# Patient Record
Sex: MALE | URBAN - METROPOLITAN AREA
[De-identification: names, ages, dates, MRNs, and addresses within clinical notes are randomized per-mention and may not be internally consistent; named-entity substitution may affect disease eponyms.]

---

## 2019-02-21 ENCOUNTER — APPOINTMENT (RX ONLY)
Dept: URBAN - METROPOLITAN AREA CLINIC 168 | Facility: CLINIC | Age: 44
Setting detail: DERMATOLOGY
End: 2019-02-21

## 2019-02-21 VITALS — HEIGHT: 69 IN | WEIGHT: 170 LBS

## 2019-02-21 DIAGNOSIS — L10.0 PEMPHIGUS VULGARIS: ICD-10-CM

## 2019-02-21 PROCEDURE — ? COUNSELING

## 2019-02-21 PROCEDURE — ? ORDER TESTS

## 2019-02-21 PROCEDURE — ? PRESCRIPTION

## 2019-02-21 PROCEDURE — 99204 OFFICE O/P NEW MOD 45 MIN: CPT

## 2019-02-21 PROCEDURE — ? ADDITIONAL NOTES

## 2019-02-21 RX ORDER — DEXAMETHASONE 0.5 MG/5ML
5 ELIXIR ORAL DAILY
Qty: 500 | Refills: 0 | Status: ERX | COMMUNITY
Start: 2019-02-21

## 2019-02-21 RX ORDER — LIDOCAINE HYDROCHLORIDE 20 MG/ML
5 SOLUTION ORAL; TOPICAL DAILY
Qty: 1 | Refills: 3 | Status: ERX | COMMUNITY
Start: 2019-02-21

## 2019-02-21 RX ADMIN — LIDOCAINE HYDROCHLORIDE 5: 20 SOLUTION ORAL; TOPICAL at 00:00

## 2019-02-21 RX ADMIN — DEXAMETHASONE 5: 0.5 ELIXIR ORAL at 00:00

## 2019-02-21 ASSESSMENT — SEVERITY ASSESSMENT: SEVERITY: MODERATE

## 2019-02-21 ASSESSMENT — PAIN INTENSITY VAS: HOW INTENSE IS YOUR PAIN 0 BEING NO PAIN, 10 BEING THE MOST SEVERE PAIN POSSIBLE?: 7/10 PAIN

## 2019-02-21 NOTE — HPI: BLISTERS (PEMPHIGUS VULGARIS)
How Severe Is It?: moderate
Is This A New Presentation, Or A Follow-Up?: Blisters
Additional History: Patient was biopsies by ENT initially resulting in pemphigus.  Patient was treated in 2012 with Prednisone.  He was on for a couple years.  He saw a dermatologist after 2 years and was treated with minocycline which allowed him to get off prednisone. He took cellcept as well in the past but does not believe it was effective.   He recently started to flare up again 3-4 months ago.  He would prefer to stay away from prednisone if possible as he had a lot of negative side effects from the medication.

## 2019-02-21 NOTE — PROCEDURE: ADDITIONAL NOTES
Detail Level: Simple
Additional Notes: Considering the risks and benefits of both of his current options, and the fact that Rituxan is FDA approved for Pemphigus, can long lasting remissions, we discussed this thoroughly and he would like to proceed with Rituxan.  \\n\\nPatient advised to get a flu shot and make sure he is up to date with all immunizations prior to Rituxan treatments.

## 2019-03-19 RX ORDER — DEXAMETHASONE 0.5 MG/5ML
ELIXIR ORAL DAILY
Qty: 500 | Refills: 0 | Status: ACTIVE

## 2019-04-02 ENCOUNTER — APPOINTMENT (RX ONLY)
Dept: URBAN - METROPOLITAN AREA CLINIC 168 | Facility: CLINIC | Age: 44
Setting detail: DERMATOLOGY
End: 2019-04-02

## 2019-04-02 DIAGNOSIS — L10.0 PEMPHIGUS VULGARIS: ICD-10-CM

## 2019-04-02 PROCEDURE — ? ORDER TESTS

## 2019-04-24 ENCOUNTER — RX ONLY (OUTPATIENT)
Age: 44
Setting detail: RX ONLY
End: 2019-04-24

## 2019-04-24 RX ORDER — DEXAMETHASONE 0.5 MG/5ML
1 ELIXIR ORAL
Qty: 500 | Refills: 2 | Status: ERX

## 2019-12-06 ENCOUNTER — APPOINTMENT (RX ONLY)
Dept: URBAN - METROPOLITAN AREA CLINIC 168 | Facility: CLINIC | Age: 44
Setting detail: DERMATOLOGY
End: 2019-12-06

## 2019-12-06 DIAGNOSIS — L10.0 PEMPHIGUS VULGARIS: ICD-10-CM | Status: WORSENING

## 2019-12-06 PROCEDURE — ? ADDITIONAL NOTES

## 2019-12-06 PROCEDURE — 99214 OFFICE O/P EST MOD 30 MIN: CPT

## 2019-12-06 PROCEDURE — ? COUNSELING

## 2019-12-06 PROCEDURE — ? ORDER TESTS

## 2019-12-06 PROCEDURE — ? PRESCRIPTION

## 2019-12-06 RX ORDER — CLOBETASOL PROPIONATE 0.5 MG/ML
1 SOLUTION TOPICAL QD
Qty: 1 | Refills: 1 | Status: ERX | COMMUNITY
Start: 2019-12-06

## 2019-12-06 RX ORDER — LIDOCAINE HYDROCHLORIDE 20 MG/ML
5 SOLUTION ORAL; TOPICAL DAILY
Qty: 1 | Refills: 3 | Status: ERX | COMMUNITY
Start: 2019-12-06

## 2019-12-06 RX ORDER — CLOBETASOL PROPIONATE 0.5 MG/G
1 GEL TOPICAL BID
Qty: 1 | Refills: 2 | Status: ERX | COMMUNITY
Start: 2019-12-06

## 2019-12-06 RX ORDER — DEXAMETHASONE 0.5 MG/5ML
5 ELIXIR ORAL DAILY
Qty: 500 | Refills: 0 | Status: CANCELLED

## 2019-12-06 RX ADMIN — CLOBETASOL PROPIONATE 1: 0.5 GEL TOPICAL at 00:00

## 2019-12-06 RX ADMIN — CLOBETASOL PROPIONATE 1: 0.5 SOLUTION TOPICAL at 00:00

## 2019-12-06 RX ADMIN — LIDOCAINE HYDROCHLORIDE 5: 20 SOLUTION ORAL; TOPICAL at 00:00

## 2019-12-06 NOTE — PROCEDURE: ADDITIONAL NOTES
Detail Level: Simple
Additional Notes: Considering the risks and benefits of both of his current options, and the fact that Rituxan is FDA approved for Pemphigus, can long lasting remissions, we discussed this thoroughly and he would like to proceed with Rituxan. His insurance cost for Rituxan was excessive, and since his insurance changes January 1st, 2020 to [employer], we will reapply for Rituxan once he sends us his new cards. \\n\\nPatient advised to get a flu shot and make sure he is up to date with all immunizations prior to Rituxan treatments.\\n\\Adelfo he has not been on systemic treatment, and we do not know if his [employer] will cover the Rituxan at a lower cost, will proceed with Azathioprine.

## 2019-12-11 ENCOUNTER — RX ONLY (OUTPATIENT)
Age: 44
Setting detail: RX ONLY
End: 2019-12-11

## 2021-01-14 ENCOUNTER — APPOINTMENT (RX ONLY)
Dept: URBAN - METROPOLITAN AREA CLINIC 168 | Facility: CLINIC | Age: 46
Setting detail: DERMATOLOGY
End: 2021-01-14

## 2021-01-14 VITALS — TEMPERATURE: 98.1 F

## 2021-01-14 DIAGNOSIS — L10.0 PEMPHIGUS VULGARIS: ICD-10-CM | Status: WORSENING

## 2021-01-14 DIAGNOSIS — F17.200 NICOTINE DEPENDENCE, UNSPECIFIED, UNCOMPLICATED: ICD-10-CM

## 2021-01-14 PROCEDURE — ? ORDER TESTS

## 2021-01-14 PROCEDURE — ? PRESCRIPTION

## 2021-01-14 PROCEDURE — 99214 OFFICE O/P EST MOD 30 MIN: CPT

## 2021-01-14 PROCEDURE — ? COUNSELING

## 2021-01-14 PROCEDURE — ? TREATMENT REGIMEN

## 2021-01-14 RX ORDER — TRIAMCINOLONE ACETONIDE 1 MG/G
1 OINTMENT TOPICAL BID
Qty: 1 | Refills: 1 | Status: ERX | COMMUNITY
Start: 2021-01-14

## 2021-01-14 RX ORDER — AZATHIOPRINE 50 MG/1
1 TABLET ORAL QD
Qty: 30 | Refills: 2 | Status: ERX | COMMUNITY
Start: 2021-01-14

## 2021-01-14 RX ORDER — PREDNISONE 10 MG/1
TABLET ORAL AS DIRECTED
Qty: 45 | Refills: 0 | Status: ERX | COMMUNITY
Start: 2021-01-14

## 2021-01-14 RX ORDER — LIDOCAINE HYDROCHLORIDE 20 MG/ML
5 SOLUTION ORAL; TOPICAL DAILY
Qty: 1 | Refills: 3 | Status: ERX

## 2021-01-14 RX ADMIN — TRIAMCINOLONE ACETONIDE 1: 1 OINTMENT TOPICAL at 00:00

## 2021-01-14 RX ADMIN — AZATHIOPRINE 1: 50 TABLET ORAL at 00:00

## 2021-01-14 RX ADMIN — PREDNISONE: 10 TABLET ORAL at 00:00

## 2021-01-14 ASSESSMENT — PAIN INTENSITY VAS: HOW INTENSE IS YOUR PAIN 0 BEING NO PAIN, 10 BEING THE MOST SEVERE PAIN POSSIBLE?: 6/10 PAIN

## 2021-01-14 ASSESSMENT — SEVERITY ASSESSMENT: SEVERITY: MODERATE

## 2021-01-14 NOTE — PROCEDURE: MIPS QUALITY
Quality 431: Preventive Care And Screening: Unhealthy Alcohol Use - Screening: Patient screened for unhealthy alcohol use using a single question and scores less than 2 times per year
Quality 110: Preventive Care And Screening: Influenza Immunization: Influenza Immunization previously received during influenza season
Quality 226: Preventive Care And Screening: Tobacco Use: Screening And Cessation Intervention: Patient screened for tobacco use, is a smoker AND received Cessation Counseling
Detail Level: Detailed

## 2021-01-14 NOTE — PROCEDURE: TREATMENT REGIMEN
Plan: CBC, CMP with desmogleins - draw now\\n\\nDiscussed that he is now considered immunosuppressed, and so he needs to be even more strict about social distancing, mask wearing, avoiding public places, grocery shop only when minimal crowds (as he already is) as he has increased risk of severe COVID infection being on these medications. Given the severity of his symptoms of disease, and repeated flares, inability to get Rituxan covered at reasonable cost to patient, this is his best option to control disease and prevent further flares in the future.
Detail Level: Zone
Initiate Treatment: Restart azathioprine 50 mg once recent labs are reviewed and OK to start. Check CBC and CMP 2 weeks later and if WNL will increase to 100mg/d, then check labs 2 weeks later.    \\n\\nStart prednisone 2  week pulse to calm current flare given significant pain from genital lesions pt states he is willing to deal with the side effects of prednisone (in the past he did not). \\n\\nTriamcinolone 0.1% ointment BID for genital lesions for no longer than 2 weeks.
Continue Regimen: Clobetasol gel and viscous lidocaine 2% topically for mouth

## 2021-02-03 ENCOUNTER — APPOINTMENT (RX ONLY)
Dept: URBAN - METROPOLITAN AREA CLINIC 168 | Facility: CLINIC | Age: 46
Setting detail: DERMATOLOGY
End: 2021-02-03

## 2021-02-03 DIAGNOSIS — F17.200 NICOTINE DEPENDENCE, UNSPECIFIED, UNCOMPLICATED: ICD-10-CM

## 2021-02-03 DIAGNOSIS — L10.0 PEMPHIGUS VULGARIS: ICD-10-CM

## 2021-02-03 PROCEDURE — ? TREATMENT REGIMEN

## 2021-02-03 PROCEDURE — ? COUNSELING

## 2021-02-03 PROCEDURE — ? PRESCRIPTION

## 2021-02-03 PROCEDURE — ? ORDER TESTS

## 2021-02-03 PROCEDURE — 99214 OFFICE O/P EST MOD 30 MIN: CPT

## 2021-02-03 RX ORDER — MYCOPHENOLATE MOFETIL 500 MG/1
1 TABLET, FILM COATED ORAL BID
Qty: 180 | Refills: 3 | Status: ERX | COMMUNITY
Start: 2021-02-03

## 2021-02-03 RX ORDER — ALENDRONATE SODIUM 70 MG/1
1 TABLET ORAL AS DIRECTED
Qty: 4 | Refills: 6 | Status: ERX | COMMUNITY
Start: 2021-02-03

## 2021-02-03 RX ORDER — PANTOPRAZOLE SODIUM 40 MG/1
1 TABLET, DELAYED RELEASE ORAL QD
Qty: 30 | Refills: 6 | Status: ERX | COMMUNITY
Start: 2021-02-03

## 2021-02-03 RX ORDER — PREDNISONE 10 MG/1
TABLET ORAL AS DIRECTED
Qty: 150 | Refills: 0 | Status: ERX

## 2021-02-03 RX ORDER — LIDOCAINE 50 MG/G
1 OINTMENT TOPICAL AS DIRECTED
Qty: 1 | Refills: 2 | Status: ERX | COMMUNITY
Start: 2021-02-03

## 2021-02-03 RX ADMIN — LIDOCAINE 1: 50 OINTMENT TOPICAL at 00:00

## 2021-02-03 RX ADMIN — MYCOPHENOLATE MOFETIL 1: 500 TABLET, FILM COATED ORAL at 00:00

## 2021-02-03 RX ADMIN — ALENDRONATE SODIUM 1: 70 TABLET ORAL at 00:00

## 2021-02-03 RX ADMIN — PANTOPRAZOLE SODIUM 1: 40 TABLET, DELAYED RELEASE ORAL at 00:00

## 2021-02-03 ASSESSMENT — SEVERITY ASSESSMENT: SEVERITY: MODERATE

## 2021-02-03 ASSESSMENT — PAIN INTENSITY VAS: HOW INTENSE IS YOUR PAIN 0 BEING NO PAIN, 10 BEING THE MOST SEVERE PAIN POSSIBLE?: 2/10 PAIN

## 2021-02-03 NOTE — PROCEDURE: TREATMENT REGIMEN
Plan: Discussed that he is now considered more immunosuppressed given both prednisone and mycophenolate, and so he needs to be even more strict about social distancing, mask wearing, avoiding public places, grocery shop only when minimal crowds (as he already is) as he has increased risk of severe COVID infection being on these medications. \\n\\nGiven the severity of his symptoms of disease, and repeated flares, inability to get Rituxan covered at reasonable cost to patient, this is his best option to control disease and prevent further flares in the future
Detail Level: Zone
Initiate Treatment: Cellcept 500mg 1 BID, increase to 2 BID if blood work WNL in 2 weeks\\nAlendronate 70mg once a week - advised to stay upright 30 minutes after dose\\nProtonix 40mg once a day\\nLidocaine 5% ointment for genital skin QID as needed
Continue Regimen: Clobetasol gel and viscous lidocaine 2% topically for mouth\\nTriamcinolone 0.1% ointment BID for genital lesions for no longer than 2 weeks, take 1 week break, repeat\\n
Modify Regimen: Prednisone taper - repeat 50mg x 5 days, 30mg x 5 days, 10mg then until follow up.

## 2021-02-05 ENCOUNTER — RX ONLY (OUTPATIENT)
Age: 46
Setting detail: RX ONLY
End: 2021-02-05

## 2021-02-05 RX ORDER — MYCOPHENOLATE MOFETIL 500 MG/1
1 TABLET, FILM COATED ORAL BID
Qty: 60 | Refills: 2 | Status: ERX | COMMUNITY
Start: 2021-02-05

## 2021-02-19 ENCOUNTER — RX ONLY (OUTPATIENT)
Age: 46
Setting detail: RX ONLY
End: 2021-02-19

## 2021-02-19 RX ORDER — MYCOPHENOLATE MOFETIL 500 MG/1
1 TABLET, FILM COATED ORAL AS DIRECTED
Qty: 120 | Refills: 6 | Status: ERX

## 2021-04-29 ENCOUNTER — APPOINTMENT (RX ONLY)
Dept: URBAN - METROPOLITAN AREA CLINIC 168 | Facility: CLINIC | Age: 46
Setting detail: DERMATOLOGY
End: 2021-04-29

## 2021-04-29 DIAGNOSIS — F17.200 NICOTINE DEPENDENCE, UNSPECIFIED, UNCOMPLICATED: ICD-10-CM

## 2021-04-29 DIAGNOSIS — L10.0 PEMPHIGUS VULGARIS: ICD-10-CM | Status: IMPROVED

## 2021-04-29 PROCEDURE — 99214 OFFICE O/P EST MOD 30 MIN: CPT

## 2021-04-29 PROCEDURE — ? COUNSELING

## 2021-04-29 PROCEDURE — ? ORDER TESTS

## 2021-04-29 PROCEDURE — ? ADDITIONAL NOTES

## 2021-04-29 ASSESSMENT — PAIN INTENSITY VAS: HOW INTENSE IS YOUR PAIN 0 BEING NO PAIN, 10 BEING THE MOST SEVERE PAIN POSSIBLE?: 1/10 PAIN

## 2021-04-29 ASSESSMENT — SEVERITY ASSESSMENT: SEVERITY: MILD

## 2021-04-29 NOTE — PROCEDURE: ADDITIONAL NOTES
Additional Notes: Continue:\\n-Cellcept 500mg 2 BID \\n-Alendronate 70mg once a week - advised to stay upright 30 minutes after dose\\n-Protonix 40mg once a day\\n-Lidocaine 5% ointment for genital skin QID as needed\\n-Prednisone 10mg a day for one more month. If he clears further then he will drop to 5 mg. \\n\\nIf he flares with decrease of prednisone may consider increase cellcept dose. \\n\\n-Clobetasol gel and viscous lidocaine 2% topically for mouth\\n-Triamcinolone 0.1% ointment BID for genital lesions for no longer than 2 weeks, take 1 week break, repeat

## 2021-06-29 ENCOUNTER — APPOINTMENT (RX ONLY)
Dept: URBAN - METROPOLITAN AREA CLINIC 168 | Facility: CLINIC | Age: 46
Setting detail: DERMATOLOGY
End: 2021-06-29

## 2021-06-29 DIAGNOSIS — L10.0 PEMPHIGUS VULGARIS: ICD-10-CM | Status: UNCHANGED

## 2021-06-29 DIAGNOSIS — F17.200 NICOTINE DEPENDENCE, UNSPECIFIED, UNCOMPLICATED: ICD-10-CM

## 2021-06-29 PROCEDURE — ? COUNSELING

## 2021-06-29 PROCEDURE — ? ADDITIONAL NOTES

## 2021-06-29 PROCEDURE — 99214 OFFICE O/P EST MOD 30 MIN: CPT

## 2021-06-29 PROCEDURE — ? ORDER TESTS

## 2021-06-29 PROCEDURE — ? PRESCRIPTION

## 2021-06-29 RX ORDER — MYCOPHENOLATE MOFETIL 500 MG/1
TABLET, FILM COATED ORAL BID
Qty: 540 | Refills: 3 | Status: ERX

## 2021-06-29 ASSESSMENT — PAIN INTENSITY VAS: HOW INTENSE IS YOUR PAIN 0 BEING NO PAIN, 10 BEING THE MOST SEVERE PAIN POSSIBLE?: NO PAIN

## 2021-06-29 ASSESSMENT — SEVERITY ASSESSMENT: SEVERITY: MODERATE

## 2021-06-29 NOTE — PROCEDURE: ADDITIONAL NOTES
Patient Management Risk Assessment: Moderate
Additional Notes: Overall much improved, but genital involvement persistent. \\n\\nModify:\\n-Increase Cellcept 500mg 3 pills twice a day\\n\\nContinue:\\n-Alendronate 70mg once a week - advised to stay upright 30 minutes after dose\\n-Protonix 40mg once a day\\n-Lidocaine 5% ointment for genital skin QID as needed\\n-Prednisone 5mg a day \\n\\n-Clobetasol gel and viscous lidocaine 2% topically for mouth\\n-Triamcinolone 0.1% ointment BID for genital lesions for no longer than 2 weeks, take 1 week break, repeat
Render Risk Assessment In Note?: no
Detail Level: Detailed

## 2023-10-03 ENCOUNTER — APPOINTMENT (RX ONLY)
Dept: URBAN - METROPOLITAN AREA CLINIC 168 | Facility: CLINIC | Age: 48
Setting detail: DERMATOLOGY
End: 2023-10-03

## 2023-10-03 DIAGNOSIS — L10.0 PEMPHIGUS VULGARIS: ICD-10-CM | Status: INADEQUATELY CONTROLLED

## 2023-10-03 PROCEDURE — ? COUNSELING

## 2023-10-03 PROCEDURE — ? ORDER TESTS

## 2023-10-03 PROCEDURE — 99214 OFFICE O/P EST MOD 30 MIN: CPT

## 2023-10-03 PROCEDURE — ? PRESCRIPTION MEDICATION MANAGEMENT

## 2023-10-03 PROCEDURE — ? PRESCRIPTION

## 2023-10-03 RX ORDER — CLOBETASOL PROPIONATE 0.05 MG/G
1 GEL TOPICAL BID
Qty: 60 | Refills: 3 | Status: ERX | COMMUNITY
Start: 2023-10-03

## 2023-10-03 RX ADMIN — CLOBETASOL PROPIONATE 1: 0.05 GEL TOPICAL at 00:00

## 2023-10-03 NOTE — PROCEDURE: MIPS QUALITY
Quality 110: Preventive Care And Screening: Influenza Immunization: Influenza Immunization previously received during influenza season
Quality 226: Preventive Care And Screening: Tobacco Use: Screening And Cessation Intervention: Patient screened for tobacco use, is a smoker AND received Cessation Counseling within measurement period or in the six months prior to the measurement period
Quality 130: Documentation Of Current Medications In The Medical Record: Current Medications Documented
Detail Level: Detailed
Quality 431: Preventive Care And Screening: Unhealthy Alcohol Use - Screening: Patient not identified as an unhealthy alcohol user when screened for unhealthy alcohol use using a systematic screening method

## 2023-10-03 NOTE — PROCEDURE: ORDER TESTS
Lab Facility: [id number]
Bill For Surgical Tray: no
Performing Laboratory: [id number]
Billing Type: Third-Party Bill
Expected Date Of Service: 10/03/2023

## 2023-10-03 NOTE — PROCEDURE: PRESCRIPTION MEDICATION MANAGEMENT
Initiate Treatment: Rituxan - once labs are received will check for current cost of coverage for a biosimilar. \\n\\nClobetasol gel BID for up to two weeks as needed for flares
Continue Regimen: Viscous Lidocaine as needed for flares - pt has a supply and discussed currently refills are not available as the drug is on backorder indefinitely due to lidocaine shortage.
Detail Level: Zone
Plan: Tried and failed: Cellcept, Prednisone, Protonix, Triamcinolone and Alendronate\\n\\nDiscussed with patient that he should seek any vaccines for which he is due prior to starting Rituxan as they are not effective if given in the 6 months after dosing. \\n\\nDiscussed Rituxan is the treatment of choice for PV still, and could result in longer remission. Reviewed cost may be more affordable now that there are biosimilars available. Discussed the main risk of Rituxan is immunosuppression, especially increased risk of death with COVID, and increased susceptibility to viral infections, but that the remainder of risk is relatively lower than other immunosuppressives. \\n\\n Pt states if affordable, he would be willing to take this medication, however if not, he does not wish to pursue any other systemic medications. Discussed a research clinical trial may become available in the future for pemphigus and patient states he is interested in the trial if available.\\n\\nWill await lab results before starting Rituxan PA
Render In Strict Bullet Format?: No